# Patient Record
Sex: FEMALE | Race: OTHER | URBAN - METROPOLITAN AREA
[De-identification: names, ages, dates, MRNs, and addresses within clinical notes are randomized per-mention and may not be internally consistent; named-entity substitution may affect disease eponyms.]

---

## 2023-09-25 ENCOUNTER — OFFICE VISIT (OUTPATIENT)
Facility: CLINIC | Age: 33
End: 2023-09-25

## 2023-09-25 DIAGNOSIS — T82.590A MALFUNCTION OF ARTERIOVENOUS DIALYSIS FISTULA, INITIAL ENCOUNTER (HCC): Primary | ICD-10-CM

## 2023-09-25 RX ORDER — CALCITRIOL 0.25 UG/1
1 CAPSULE, LIQUID FILLED ORAL 2 TIMES DAILY
COMMUNITY

## 2023-09-25 RX ORDER — SEVELAMER CARBONATE 800 MG/1
3 TABLET, FILM COATED ORAL
COMMUNITY
Start: 2023-04-29

## 2023-10-02 NOTE — PROGRESS NOTES
Jose C Roth MD  Vascular Surgery  [facility]         VASCULAR SURGERY OFFICE NOTE         Name: Jeaneth Sweeney   : 1990  [id number]     REASON FOR VISIT:   Patient is a 34year old female who is here for her post op visit s/p left radiocephalic arteriovenous fistula that was created at [facility] in  of this year. The patient also developed very nicely and was being used until last week when she was told that they were not able to access the fistula. PAST MEDICAL HISTORY:   No past medical history on file. PAST SURGICAL HISTORY:   No past surgical history on file. MEDICATIONS:     Current Outpatient Medications   Medication Sig Dispense Refill    calcitriol 0.25 MCG Oral Cap Take 1 capsule (0.25 mcg total) by mouth 2 (two) times daily. iron sucrose in sodium chloride 50 mL (100 mg total). sevelamer carbonate 800 MG Oral Tab Take 3 tablets (2,400 mg total) by mouth. EXAM:   There were no vitals taken for this visit. The left radiocephalic arteriovenous fistula is widely patent with an excellent thrill and no pulsatility. ASSESSMENT    Diagnoses and all orders for this visit:    Malfunction of arteriovenous dialysis fistula, initial encounter [facility])    The patient is s/p a left radiocephalic fistula creation that appears to be functioning fine and I think that the inability to access it was due to a operator technique. I have asked her to go back and have them try to access it again as it appears to be very superficial and well developed. If they continue to have problems with that then we will need to do a fistula ultrasound but I think based on my exam this will not be necessary. PLAN:   The patient can follow-up with me on an as-needed basis. Jose C Roth MD  Division of Vascular Surgery

## 2023-10-05 ENCOUNTER — TELEPHONE (OUTPATIENT)
Facility: CLINIC | Age: 33
End: 2023-10-05

## 2023-10-05 NOTE — TELEPHONE ENCOUNTER
Verified with Sarah at Dialysis Dept patient is Self-Pay. Follow up appt on 10-16-23 at 2pm; Left message to patient if any other insurance coverage.

## 2023-10-16 ENCOUNTER — OFFICE VISIT (OUTPATIENT)
Facility: CLINIC | Age: 33
End: 2023-10-16

## 2023-10-16 VITALS — BODY MASS INDEX: 25.27 KG/M2 | HEIGHT: 64 IN | WEIGHT: 148 LBS | RESPIRATION RATE: 20 BRPM

## 2023-10-16 DIAGNOSIS — N18.6 ESRD ON DIALYSIS (HCC): Primary | ICD-10-CM

## 2023-10-16 DIAGNOSIS — Z99.2 ESRD ON DIALYSIS (HCC): Primary | ICD-10-CM

## 2023-10-18 ENCOUNTER — TELEPHONE (OUTPATIENT)
Facility: CLINIC | Age: 33
End: 2023-10-18